# Patient Record
Sex: FEMALE | Race: WHITE | NOT HISPANIC OR LATINO | Employment: FULL TIME | URBAN - METROPOLITAN AREA
[De-identification: names, ages, dates, MRNs, and addresses within clinical notes are randomized per-mention and may not be internally consistent; named-entity substitution may affect disease eponyms.]

---

## 2021-07-11 ENCOUNTER — HOSPITAL ENCOUNTER (EMERGENCY)
Age: 26
Discharge: PSYCHIATRIC OR SUBSTANCE USE FACILITY | End: 2021-07-12
Attending: EMERGENCY MEDICINE

## 2021-07-11 DIAGNOSIS — F32.A DEPRESSION, UNSPECIFIED DEPRESSION TYPE: Primary | ICD-10-CM

## 2021-07-11 DIAGNOSIS — R45.851 SUICIDAL IDEATION: ICD-10-CM

## 2021-07-11 LAB
AMPHETAMINES UR QL SCN>500 NG/ML: NEGATIVE
ANION GAP SERPL CALC-SCNC: 7 MMOL/L (ref 10–20)
BARBITURATES UR QL SCN>200 NG/ML: NEGATIVE
BASOPHILS # BLD: 0 K/MCL (ref 0–0.3)
BASOPHILS NFR BLD: 0 %
BENZODIAZ UR QL SCN>200 NG/ML: NEGATIVE
BUN SERPL-MCNC: 18 MG/DL (ref 6–20)
BUN/CREAT SERPL: 23 (ref 7–25)
BZE UR QL SCN>150 NG/ML: NEGATIVE
CALCIUM SERPL-MCNC: 9.8 MG/DL (ref 8.4–10.2)
CANNABINOIDS UR QL SCN>50 NG/ML: NEGATIVE
CHLORIDE SERPL-SCNC: 107 MMOL/L (ref 98–107)
CO2 SERPL-SCNC: 27 MMOL/L (ref 21–32)
CREAT SERPL-MCNC: 0.8 MG/DL (ref 0.51–0.95)
DEPRECATED RDW RBC: 45.7 FL (ref 39–50)
EOSINOPHIL # BLD: 0.1 K/MCL (ref 0–0.5)
EOSINOPHIL NFR BLD: 1 %
ERYTHROCYTE [DISTWIDTH] IN BLOOD: 13.9 % (ref 11–15)
ETHANOL SERPL-MCNC: NORMAL MG/DL
FASTING DURATION TIME PATIENT: ABNORMAL H
GFR SERPLBLD BASED ON 1.73 SQ M-ARVRAT: >90 ML/MIN/1.73M2
GLUCOSE SERPL-MCNC: 105 MG/DL (ref 65–99)
HCG UR QL: NEGATIVE
HCT VFR BLD CALC: 39.6 % (ref 36–46.5)
HGB BLD-MCNC: 12.7 G/DL (ref 12–15.5)
IMM GRANULOCYTES # BLD AUTO: 0 K/MCL (ref 0–0.2)
IMM GRANULOCYTES # BLD: 0 %
LYMPHOCYTES # BLD: 2.5 K/MCL (ref 1–4.8)
LYMPHOCYTES NFR BLD: 28 %
MCH RBC QN AUTO: 29.1 PG (ref 26–34)
MCHC RBC AUTO-ENTMCNC: 32.1 G/DL (ref 32–36.5)
MCV RBC AUTO: 90.6 FL (ref 78–100)
MONOCYTES # BLD: 0.7 K/MCL (ref 0.3–0.9)
MONOCYTES NFR BLD: 7 %
NEUTROPHILS # BLD: 5.8 K/MCL (ref 1.8–7.7)
NEUTROPHILS NFR BLD: 64 %
NRBC BLD MANUAL-RTO: 0 /100 WBC
OPIATES UR QL SCN>300 NG/ML: NEGATIVE
PCP UR QL SCN>25 NG/ML: NEGATIVE
PLATELET # BLD AUTO: 215 K/MCL (ref 140–450)
POTASSIUM SERPL-SCNC: 3.9 MMOL/L (ref 3.4–5.1)
RBC # BLD: 4.37 MIL/MCL (ref 4–5.2)
SODIUM SERPL-SCNC: 137 MMOL/L (ref 135–145)
WBC # BLD: 9 K/MCL (ref 4.2–11)

## 2021-07-11 PROCEDURE — 85025 COMPLETE CBC W/AUTO DIFF WBC: CPT | Performed by: EMERGENCY MEDICINE

## 2021-07-11 PROCEDURE — 82077 ASSAY SPEC XCP UR&BREATH IA: CPT | Performed by: EMERGENCY MEDICINE

## 2021-07-11 PROCEDURE — C9803 HOPD COVID-19 SPEC COLLECT: HCPCS

## 2021-07-11 PROCEDURE — 80048 BASIC METABOLIC PNL TOTAL CA: CPT | Performed by: EMERGENCY MEDICINE

## 2021-07-11 PROCEDURE — 36415 COLL VENOUS BLD VENIPUNCTURE: CPT

## 2021-07-11 PROCEDURE — 99284 EMERGENCY DEPT VISIT MOD MDM: CPT

## 2021-07-11 PROCEDURE — 84703 CHORIONIC GONADOTROPIN ASSAY: CPT

## 2021-07-11 PROCEDURE — 99284 EMERGENCY DEPT VISIT MOD MDM: CPT | Performed by: EMERGENCY MEDICINE

## 2021-07-11 PROCEDURE — 80307 DRUG TEST PRSMV CHEM ANLYZR: CPT | Performed by: EMERGENCY MEDICINE

## 2021-07-11 ASSESSMENT — PAIN SCALES - GENERAL: PAINLEVEL_OUTOF10: 0

## 2021-07-12 VITALS
TEMPERATURE: 98.4 F | DIASTOLIC BLOOD PRESSURE: 75 MMHG | HEART RATE: 54 BPM | HEIGHT: 62 IN | SYSTOLIC BLOOD PRESSURE: 110 MMHG | WEIGHT: 150 LBS | OXYGEN SATURATION: 99 % | BODY MASS INDEX: 27.6 KG/M2 | RESPIRATION RATE: 16 BRPM

## 2021-07-12 LAB
SARS-COV-2 RNA RESP QL NAA+PROBE: NOT DETECTED
SERVICE CMNT-IMP: NORMAL
SERVICE CMNT-IMP: NORMAL

## 2021-07-12 PROCEDURE — 87635 SARS-COV-2 COVID-19 AMP PRB: CPT | Performed by: EMERGENCY MEDICINE

## 2021-07-12 ASSESSMENT — COLUMBIA-SUICIDE SEVERITY RATING SCALE - C-SSRS
ATTEMPT PAST THREE MONTHS: NO
TOTAL  NUMBER OF INTERRUPTED ATTEMPTS PAST 3 MONTHS: NO
6. HAVE YOU EVER DONE ANYTHING, STARTED TO DO ANYTHING, OR PREPARED TO DO ANYTHING TO END YOUR LIFE?: YES
6. HAVE YOU EVER DONE ANYTHING, STARTED TO DO ANYTHING, OR PREPARED TO DO ANYTHING TO END YOUR LIFE?: NO
REASONS FOR IDEATION PAST MONTH: MOSTLY TO END OR STOP THE PAIN (YOU COULDN'T GO ON LIVING WITH THE PAIN OR HOW YOU WERE FEELING)
TOTAL  NUMBER OF ABORTED OR SELF INTERRUPTED ATTEMPTS PAST 3 MONTHS: YES
TOTAL  NUMBER OF INTERRUPTED ATTEMPTS LIFETIME: NO
TOTAL  NUMBER OF ABORTED OR SELF INTERRUPTED ATTEMPTS PAST 3 MONTHS: 1
ATTEMPT LIFETIME: NO
REASONS FOR IDEATION LIFETIME: MOSTLY TO END OR STOP THE PAIN (YOU COULDN'T GO ON LIVING WITH THE PAIN OR HOW YOU WERE FEELING)

## 2021-07-12 ASSESSMENT — COGNITIVE AND FUNCTIONAL STATUS - GENERAL
MOOD: DEPRESSED
ATTENTION_CALCULATED: MAINTAINS ATTENTION
BEHAVIOR: APPROPRIATE TO SITUATION
AFFECT: ANXIOUS
ORIENTATION: ORIENTED (PERSON/PLACE/TIME)
MOTOR_BEHAVIOR-RETARDATION_CALCULATED: CALM AND PURPOSEFUL
MOTOR_BEHAVIOR-AGITATION_CALCULATED: CALM AND PURPOSEFUL
SPEECH: CLEAR/UNDERSTANDABLE
PERCEPTUAL_MISINTERPRETATIONS_HALLUCINATIONS: CLEAR REALITY BASED PERCEPTIONS
LEVEL_OF_CONSCIOUSNESS_CALCULATED: ALERT
MEMORY: INTACT

## 2021-07-12 ASSESSMENT — LIFESTYLE VARIABLES
AGE OF ALCOHOL ONSET: 21
HOW OFTEN DO YOU HAVE A DRINK CONTAINING ALCOHOL: MONTHLY OR LESS
AUDIT-C TOTAL SCORE: 1
ALCOHOL_USE: YES
HOW MANY STANDARD DRINKS CONTAINING ALCOHOL DO YOU HAVE ON A TYPICAL DAY: 0,1 OR 2
ALCOHOL_TYPE: BEER
ALCOHOL_ROUTE: PO
HOW OFTEN DO YOU HAVE 6 OR MORE DRINKS ON ONE OCCASION: NEVER

## 2023-02-13 ENCOUNTER — TELEPHONE (OUTPATIENT)
Dept: OBGYN CLINIC | Facility: CLINIC | Age: 28
End: 2023-02-13

## 2023-02-13 NOTE — TELEPHONE ENCOUNTER
Patient calling with +HPT. LMP was either 12/27/22 or 1/19/23. She believes 1/19/23 is correct, but states cycle was 3 days instead of her typical 5, and lighter than normal. Patient agrees to policy to rotate care between all providers in the office, and understands that on-call provider will deliver her. Patient directed to start PNV with iron, DHA and folic acid. Pt scheduled for NP, missed menses appt with [facility]. Explained that once dating is established, she will need OBN and NPN. Patient verbalized understanding.

## 2023-02-24 ENCOUNTER — OFFICE VISIT (OUTPATIENT)
Dept: OBGYN CLINIC | Facility: CLINIC | Age: 28
End: 2023-02-24

## 2023-02-24 ENCOUNTER — LAB ENCOUNTER (OUTPATIENT)
Dept: LAB | Facility: HOSPITAL | Age: 28
End: 2023-02-24
Attending: NURSE PRACTITIONER
Payer: COMMERCIAL

## 2023-02-24 VITALS — HEART RATE: 88 BPM | SYSTOLIC BLOOD PRESSURE: 117 MMHG | DIASTOLIC BLOOD PRESSURE: 77 MMHG | WEIGHT: 131 LBS

## 2023-02-24 DIAGNOSIS — N63.11 MASS OF UPPER OUTER QUADRANT OF RIGHT BREAST: ICD-10-CM

## 2023-02-24 DIAGNOSIS — N92.6 MISSED MENSES: ICD-10-CM

## 2023-02-24 DIAGNOSIS — N92.6 IRREGULAR PERIODS: ICD-10-CM

## 2023-02-24 DIAGNOSIS — Z01.419 WELL WOMAN EXAM WITH ROUTINE GYNECOLOGICAL EXAM: Primary | ICD-10-CM

## 2023-02-24 DIAGNOSIS — Z32.01 PREGNANCY EXAMINATION OR TEST, POSITIVE RESULT: ICD-10-CM

## 2023-02-24 DIAGNOSIS — Z12.4 SCREENING FOR CERVICAL CANCER: ICD-10-CM

## 2023-02-24 LAB
ANTIBODY SCREEN: NEGATIVE
B-HCG SERPL-ACNC: ABNORMAL MIU/ML
BASOPHILS # BLD AUTO: 0.02 X10(3) UL (ref 0–0.2)
BASOPHILS NFR BLD AUTO: 0.3 %
DEPRECATED RDW RBC AUTO: 47.7 FL (ref 35.1–46.3)
EOSINOPHIL # BLD AUTO: 0.05 X10(3) UL (ref 0–0.7)
EOSINOPHIL NFR BLD AUTO: 0.7 %
ERYTHROCYTE [DISTWIDTH] IN BLOOD BY AUTOMATED COUNT: 14.3 % (ref 11–15)
HBV SURFACE AG SER-ACNC: <0.1 [IU]/L
HBV SURFACE AG SERPL QL IA: NONREACTIVE
HCT VFR BLD AUTO: 35.8 %
HCV AB SERPL QL IA: NONREACTIVE
HGB BLD-MCNC: 11.9 G/DL
IMM GRANULOCYTES # BLD AUTO: 0.03 X10(3) UL (ref 0–1)
IMM GRANULOCYTES NFR BLD: 0.4 %
LYMPHOCYTES # BLD AUTO: 1.96 X10(3) UL (ref 1–4)
LYMPHOCYTES NFR BLD AUTO: 26.3 %
MCH RBC QN AUTO: 30.1 PG (ref 26–34)
MCHC RBC AUTO-ENTMCNC: 33.2 G/DL (ref 31–37)
MCV RBC AUTO: 90.4 FL
MONOCYTES # BLD AUTO: 0.5 X10(3) UL (ref 0.1–1)
MONOCYTES NFR BLD AUTO: 6.7 %
NEUTROPHILS # BLD AUTO: 4.88 X10 (3) UL (ref 1.5–7.7)
NEUTROPHILS # BLD AUTO: 4.88 X10(3) UL (ref 1.5–7.7)
NEUTROPHILS NFR BLD AUTO: 65.6 %
PLATELET # BLD AUTO: 176 10(3)UL (ref 150–450)
RBC # BLD AUTO: 3.96 X10(6)UL
RH BLOOD TYPE: POSITIVE
RUBV IGG SER QL: POSITIVE
RUBV IGG SER-ACNC: 143.7 IU/ML (ref 10–?)
WBC # BLD AUTO: 7.4 X10(3) UL (ref 4–11)

## 2023-02-24 PROCEDURE — 86803 HEPATITIS C AB TEST: CPT

## 2023-02-24 PROCEDURE — 85025 COMPLETE CBC W/AUTO DIFF WBC: CPT

## 2023-02-24 PROCEDURE — 99385 PREV VISIT NEW AGE 18-39: CPT | Performed by: NURSE PRACTITIONER

## 2023-02-24 PROCEDURE — 87591 N.GONORRHOEAE DNA AMP PROB: CPT

## 2023-02-24 PROCEDURE — 36415 COLL VENOUS BLD VENIPUNCTURE: CPT

## 2023-02-24 PROCEDURE — 86901 BLOOD TYPING SEROLOGIC RH(D): CPT

## 2023-02-24 PROCEDURE — 87340 HEPATITIS B SURFACE AG IA: CPT

## 2023-02-24 PROCEDURE — 87086 URINE CULTURE/COLONY COUNT: CPT

## 2023-02-24 PROCEDURE — 3074F SYST BP LT 130 MM HG: CPT | Performed by: NURSE PRACTITIONER

## 2023-02-24 PROCEDURE — 3078F DIAST BP <80 MM HG: CPT | Performed by: NURSE PRACTITIONER

## 2023-02-24 PROCEDURE — 86900 BLOOD TYPING SEROLOGIC ABO: CPT

## 2023-02-24 PROCEDURE — 86850 RBC ANTIBODY SCREEN: CPT

## 2023-02-24 PROCEDURE — 87491 CHLMYD TRACH DNA AMP PROBE: CPT

## 2023-02-24 PROCEDURE — 84702 CHORIONIC GONADOTROPIN TEST: CPT

## 2023-02-24 PROCEDURE — 86762 RUBELLA ANTIBODY: CPT

## 2023-02-24 PROCEDURE — 86780 TREPONEMA PALLIDUM: CPT

## 2023-02-24 PROCEDURE — 87389 HIV-1 AG W/HIV-1&-2 AB AG IA: CPT

## 2023-02-24 RX ORDER — CHOLECALCIFEROL (VITAMIN D3) 25 MCG
1 TABLET,CHEWABLE ORAL DAILY
COMMUNITY

## 2023-02-25 ENCOUNTER — PATIENT MESSAGE (OUTPATIENT)
Dept: OBGYN CLINIC | Facility: CLINIC | Age: 28
End: 2023-02-25

## 2023-02-26 LAB
C TRACH DNA SPEC QL NAA+PROBE: NEGATIVE
N GONORRHOEA DNA SPEC QL NAA+PROBE: NEGATIVE
T PALLIDUM AB SER QL: NEGATIVE
T VAGINALIS RRNA SPEC QL NAA+PROBE: NEGATIVE

## 2023-02-27 ENCOUNTER — TELEPHONE (OUTPATIENT)
Dept: OBGYN CLINIC | Facility: CLINIC | Age: 28
End: 2023-02-27

## 2023-02-27 DIAGNOSIS — N63.11 MASS OF UPPER OUTER QUADRANT OF RIGHT BREAST: Primary | ICD-10-CM

## 2023-02-27 NOTE — TELEPHONE ENCOUNTER
From: Annmarie Cagle  To: HERMAN Coto  Sent: 2/25/2023 4:54 PM CST  Subject: Pregnancy ultrasound request    Ashley Boyd :)    I called to schedule my pregnancy ultrasound but was unable to due to no order in the system. Would you be able to put in an order for me please?      Thank you so much,  Jennifer Huddleston

## 2023-02-27 NOTE — TELEPHONE ENCOUNTER
Noticed that pt is pregnant. Called radiology back to notify them that pt is pregnant and that is why mammo was not ordered. BODØ from radiology stated that they still need breast US order to say \"yes\" to follow up mammo if needed. BODØ stated that very rarely will they need to be done, but order needs to say yes.

## 2023-02-27 NOTE — TELEPHONE ENCOUNTER
Davina from radiology states pt has an order for an US breast R limited, order is correct but due to age criteria needs to say yes for a f/u mammogram if needed.  Please advise

## 2023-02-27 NOTE — TELEPHONE ENCOUNTER
Alfonso from radiology stated that [facility] placed order for US right breast limited, but the question in the order that asks if OK to perform bilateral mammogram if needed needs to say \"YES\". alfonso stated that [facility] marked question as NO. Alfonso stated that based on pts age, it needs to be checked yes. Message to [facility] if OK to change order?

## 2023-03-13 ENCOUNTER — PATIENT MESSAGE (OUTPATIENT)
Dept: OBGYN CLINIC | Facility: CLINIC | Age: 28
End: 2023-03-13

## 2023-03-17 ENCOUNTER — TELEPHONE (OUTPATIENT)
Dept: OBGYN CLINIC | Facility: CLINIC | Age: 28
End: 2023-03-17

## 2023-03-17 NOTE — TELEPHONE ENCOUNTER
Received fax, OB US dated 3/15/23 from [address] [place] [address]. Report to [facility] desk for review.

## 2023-03-17 NOTE — TELEPHONE ENCOUNTER
Reviewed US from 3/15/2023 - shows Single live IUP CRL 8w1d, + bpm, normal uterus and ovaries. EDC base on US is 10/4/2023. Rns please call patient with results and schedule for  New OB ed and new OB visit.     Macy Briones, APRN

## 2023-03-21 ENCOUNTER — TELEPHONE (OUTPATIENT)
Dept: OBGYN CLINIC | Facility: CLINIC | Age: 28
End: 2023-03-21

## 2023-03-21 DIAGNOSIS — N63.11 MASS OF UPPER OUTER QUADRANT OF RIGHT BREAST: Primary | ICD-10-CM

## 2023-03-21 NOTE — TELEPHONE ENCOUNTER
Right breast US results dated 3/16/2023 received from [facility] and placed on [facility]'s desk for review.

## 2023-03-22 ENCOUNTER — NURSE ONLY (OUTPATIENT)
Dept: OBGYN CLINIC | Facility: CLINIC | Age: 28
End: 2023-03-22

## 2023-03-22 DIAGNOSIS — Z34.01 ENCOUNTER FOR SUPERVISION OF NORMAL FIRST PREGNANCY IN FIRST TRIMESTER: Primary | ICD-10-CM

## 2023-03-22 NOTE — TELEPHONE ENCOUNTER
Pt informed and order placed. Pt advised if she decides to go to [facility] to let us know once appt is scheduled and we can fax the order to them.

## 2023-03-22 NOTE — TELEPHONE ENCOUNTER
rev'd breast US from [facility]. Recommend follow up US of right breast in 6 months - around September of 2023.  See below

## 2023-03-22 NOTE — PROGRESS NOTES
Pt seen for OBN appt today with no complaints dating 9w1d by US. Normal PN labs already completed. I ordered varicella now, since not done. Pt scheduled for NPN appt tomorrow at 0800 with [facility]. Pt desires FTS. HT 5'1\"   lb  BMI 24.7    Partner's name is Lamar Abler contact #[phone]; race:   Occupation: Radiology tech    MEDICAL HISTORY    Anemia No    Anesthetic complications No    Anxiety/Depression  Yes Pt states well-managed    Autoimmune Disorder No    Asthma  No    Cancer No    Diabetes  No    Gyne/breast Surgery Yes BL breast enhancement Sept 2022   Heart Disease No    Hepatitis/Liver Disease  No    History of blood transfusion No    History of abnormal pap No    Hypertension  No    Infertility  No    Kidney Disease/Frequent UTIs  No    Medication Allergies No    Latex Allergies No    Food Allergies  No    Neurological Disorder/Epilepsy No    Operations/Hospitalizations Yes Appendectomy 1999  Liposuction Sept 2022  Tonsillectomy 2000   TB exposure  No    Thyroid Dysfunction No    Trauma/Violence  No    Uterine Anomaly  No    Uterine Fibroids  No    Variocosities/DVTs No    Smoker No    Drug usage in prior year No    Alcohol No    Would you accept a blood transfusion?  Yes              INFECTION HISTORY    Chlamydia No    Pt or partner have hx of Genital Herpes No    Gonorrhea No    Hepatitis B No    HIV No    HPV No    MRSA No    Syphilis No    Tattoos Yes    Live with someone or Exposed to TB No    Rash or viral illness since LMP  No    Varicella No Varicella igg ordered   Recent Travel (or planned travel) to [facility] area for self and or partner Yes Pt traveled to [facility] recently, reviewed CDC zika map, no current outbreaks   Pets Yes 1 dog       GENETICS SCREENING    Genetic Screening    Genetic Screening/Teratology Counseling- Includes patient, baby's father, or anyone in either family with:  Patient's age 28 years or older as of estimated date of delivery: No   Thalassemia ([place], [facility], [address] [facility], or  background): MCV less than 80: No   Neural tube defect (Meningomyelocele, Spina bifida, or Anencephaly): No   Congenital heart defect: Yes (Comment: pt's brother with unknown heart condition)   Down syndrome: No   Brian-Sachs ([place]kenazi [place] orthodox, [place], [place]): No   Canavan disease ([place]kenazi [place] orthodox): No   Familial dysautonomia (Ashkenazi Oriental orthodox): No   Sickle cell disease or trait (): No   Hemophilia or other blood disorders: No   Muscular dystrophy: No    Cystic fibrosis: No   Dimmit's chorea: No   Intellectual disability and/or autism: Yes (Comment: pt's neice with autism)   If yes, was the person tested for Fragile X?: No   Other inherited genetic or chromosomal disorder: No   Maternal metabolic disorder (eg. Type 1 diabetes, PKU): No   Patient or baby's father had child with birth defects not listed above: No   Recurrent pregnancy loss, or a stillbirth: Yes (Comment: pt' sister with multiple miscarriages, pt's cousin with a stillbirth)   Medications (including supplements, vitamins, herbs, or OTC drugs)/illicit/recreational drugs/alcohol since last menstrual period: No             MISC    Infant vaccinations  Yes    Pt. Has answered NO 5P questions and has NO  risk factors. Pt. Given What pregnant women need to know handout.

## 2023-03-23 ENCOUNTER — TELEPHONE (OUTPATIENT)
Dept: OBGYN CLINIC | Facility: CLINIC | Age: 28
End: 2023-03-23

## 2023-03-23 ENCOUNTER — INITIAL PRENATAL (OUTPATIENT)
Dept: OBGYN CLINIC | Facility: CLINIC | Age: 28
End: 2023-03-23

## 2023-03-23 VITALS
HEART RATE: 84 BPM | SYSTOLIC BLOOD PRESSURE: 111 MMHG | HEIGHT: 60.5 IN | BODY MASS INDEX: 25.75 KG/M2 | DIASTOLIC BLOOD PRESSURE: 79 MMHG | WEIGHT: 134.63 LBS

## 2023-03-23 DIAGNOSIS — Z34.80 ENCOUNTER FOR SUPERVISION OF OTHER NORMAL PREGNANCY, UNSPECIFIED TRIMESTER: Primary | ICD-10-CM

## 2023-03-23 DIAGNOSIS — Z3A.09 9 WEEKS GESTATION OF PREGNANCY: ICD-10-CM

## 2023-03-23 DIAGNOSIS — Z36.9 FIRST TRIMESTER SCREENING: Primary | ICD-10-CM

## 2023-03-23 PROBLEM — Z13.79 GENETIC TESTING: Status: ACTIVE | Noted: 2023-03-23

## 2023-03-23 PROBLEM — N63.11 MASS OF UPPER OUTER QUADRANT OF RIGHT BREAST: Status: ACTIVE | Noted: 2023-03-23

## 2023-03-23 PROBLEM — F41.9 ANXIETY: Status: ACTIVE | Noted: 2023-03-23

## 2023-03-23 LAB
APPEARANCE: CLEAR
GLUCOSE (URINE DIPSTICK): NEGATIVE MG/DL
MULTISTIX LOT#: ABNORMAL NUMERIC
NITRITE, URINE: NEGATIVE
PH, URINE: 5 (ref 4.5–8)
SPECIFIC GRAVITY: 1.02 (ref 1–1.03)
URINE-COLOR: YELLOW
UROBILINOGEN,SEMI-QN: 0.2 MG/DL (ref 0–1.9)

## 2023-03-23 PROCEDURE — 76815 OB US LIMITED FETUS(S): CPT | Performed by: NURSE PRACTITIONER

## 2023-03-23 PROCEDURE — 3008F BODY MASS INDEX DOCD: CPT | Performed by: NURSE PRACTITIONER

## 2023-03-23 PROCEDURE — 3074F SYST BP LT 130 MM HG: CPT | Performed by: NURSE PRACTITIONER

## 2023-03-23 PROCEDURE — 81002 URINALYSIS NONAUTO W/O SCOPE: CPT | Performed by: NURSE PRACTITIONER

## 2023-03-23 PROCEDURE — 3078F DIAST BP <80 MM HG: CPT | Performed by: NURSE PRACTITIONER

## 2023-03-23 NOTE — PROGRESS NOTES
New OB. Feeling well. Denies pelvic pain or bleeding. No nausea or vomiting. Sure LMP, regular cycles. Dating US done at [facility]. US on 3/15/2023 shows Isreal Ivette c/w EDC of 10/24/2023. Final EDC based on LMP. Today CRL 9w0d, +FCA. rev'd all OB labs, desires genetic testing - will send referral. UTD with flu and covid vaccines. She is a radiology tech - rev'd precautions. Hx of anxiety - doing well, CTM. Breast lump palpated at last visit. She had US done at [facility] and needs 6 month follow up in 9/2023.  Pap 2/2023 NILM, cultures negative  rto 4 weeks

## 2023-03-23 NOTE — TELEPHONE ENCOUNTER
----- Message from HERMAN Shore sent at 3/23/2023  8:55 AM CDT -----  Regarding: mfm consult  Patient desires [facility] genetic and carrier testing    HERMAN Shore

## 2024-07-17 ENCOUNTER — APPOINTMENT (OUTPATIENT)
Dept: FAMILY MEDICINE | Age: 29
End: 2024-07-17

## 2024-08-14 ENCOUNTER — BEHAVIORAL HEALTH (OUTPATIENT)
Dept: BEHAVIORAL HEALTH | Age: 29
End: 2024-08-14

## 2024-08-14 DIAGNOSIS — F41.1 GENERALIZED ANXIETY DISORDER WITH PANIC ATTACKS: ICD-10-CM

## 2024-08-14 DIAGNOSIS — F41.0 GENERALIZED ANXIETY DISORDER WITH PANIC ATTACKS: ICD-10-CM

## 2024-08-14 DIAGNOSIS — F39 MOOD DISORDER (CMD): Primary | ICD-10-CM

## 2024-08-14 PROCEDURE — 90792 PSYCH DIAG EVAL W/MED SRVCS: CPT

## 2024-08-14 RX ORDER — LAMOTRIGINE 25 MG/1
25-50 TABLET ORAL DAILY
Qty: 60 TABLET | Refills: 0 | Status: SHIPPED | OUTPATIENT
Start: 2024-08-14

## 2024-08-14 ASSESSMENT — ENCOUNTER SYMPTOMS
WHEEZING: 0
NUMBNESS: 0
COUGH: 0
SHORTNESS OF BREATH: 0
TREMORS: 0
SLEEP DISTURBANCE: 1
SEIZURES: 0
DIZZINESS: 0
LIGHT-HEADEDNESS: 0
NERVOUS/ANXIOUS: 1
HEADACHES: 0
DIARRHEA: 0
CHEST TIGHTNESS: 0
VOMITING: 0
AGITATION: 0
WEAKNESS: 0
CONSTIPATION: 0
CONFUSION: 0
NAUSEA: 0
CHOKING: 0

## 2024-08-14 ASSESSMENT — PATIENT HEALTH QUESTIONNAIRE - PHQ9
1. LITTLE INTEREST OR PLEASURE IN DOING THINGS: SEVERAL DAYS
SUM OF ALL RESPONSES TO PHQ9 QUESTIONS 1 AND 2: 2
CLINICAL INTERPRETATION OF PHQ2 SCORE: 2
2. FEELING DOWN, DEPRESSED OR HOPELESS: SEVERAL DAYS
CLINICAL INTERPRETATION OF PHQ2 SCORE: NO FURTHER SCREENING NEEDED

## 2024-08-14 ASSESSMENT — ANXIETY QUESTIONNAIRES
4. TROUBLE RELAXING: MORE THAN HALF THE DAYS
GAD7 TOTAL SCORE: 15
5. BEING SO RESTLESS THAT IT IS HARD TO SIT STILL: SEVERAL DAYS
3. WORRYING TOO MUCH ABOUT DIFFERENT THINGS: NEARLY EVERY DAY
7. FEELING AFRAID AS IF SOMETHING AWFUL MIGHT HAPPEN: MORE THAN HALF THE DAYS
2. NOT BEING ABLE TO STOP OR CONTROL WORRYING: MORE THAN HALF THE DAYS
4. TROUBLE RELAXING: MORE THAN HALF THE DAYS
IF YOU CHECKED OFF ANY PROBLEMS ON THIS QUESTIONNAIRE, HOW DIFFICULT HAVE THESE PROBLEMS MADE IT FOR YOU TO DO YOUR WORK, TAKE CARE OF THINGS AT HOME, OR GET ALONG WITH OTHER PEOPLE: SOMEWHAT DIFFICULT
2. NOT BEING ABLE TO STOP OR CONTROL WORRYING: MORE THAN HALF THE DAYS
7. FEELING AFRAID AS IF SOMETHING AWFUL MIGHT HAPPEN: MORE THAN HALF THE DAYS
5. BEING SO RESTLESS THAT IT IS HARD TO SIT STILL: SEVERAL DAYS
3. WORRYING TOO MUCH ABOUT DIFFERENT THINGS: NEARLY EVERY DAY
6. BECOMING EASILY ANNOYED OR IRRITABLE: NEARLY EVERY DAY
1. FEELING NERVOUS, ANXIOUS, OR ON EDGE: MORE THAN HALF THE DAYS
1. FEELING NERVOUS, ANXIOUS, OR ON EDGE: MORE THAN HALF THE DAYS
6. BECOMING EASILY ANNOYED OR IRRITABLE: NEARLY EVERY DAY
IF YOU CHECKED OFF ANY PROBLEMS ON THIS QUESTIONNAIRE, HOW DIFFICULT HAVE THESE PROBLEMS MADE IT FOR YOU TO DO YOUR WORK, TAKE CARE OF THINGS AT HOME, OR GET ALONG WITH OTHER PEOPLE: SOMEWHAT DIFFICULT

## 2024-08-21 ENCOUNTER — APPOINTMENT (OUTPATIENT)
Dept: BEHAVIORAL HEALTH | Age: 29
End: 2024-08-21

## 2024-08-21 DIAGNOSIS — F39 MOOD DISORDER (CMD): ICD-10-CM

## 2024-08-21 DIAGNOSIS — F41.1 GENERALIZED ANXIETY DISORDER WITH PANIC ATTACKS: Primary | ICD-10-CM

## 2024-08-21 DIAGNOSIS — F41.0 GENERALIZED ANXIETY DISORDER WITH PANIC ATTACKS: Primary | ICD-10-CM

## 2024-08-21 PROBLEM — F43.9 TRAUMA AND STRESSOR-RELATED DISORDER: Status: ACTIVE | Noted: 2024-08-21

## 2024-08-21 PROCEDURE — 99214 OFFICE O/P EST MOD 30 MIN: CPT

## 2024-08-21 RX ORDER — LAMOTRIGINE 25 MG/1
50 TABLET ORAL DAILY
Qty: 60 TABLET | Refills: 0 | Status: SHIPPED | OUTPATIENT
Start: 2024-08-21

## 2024-09-04 ENCOUNTER — HOSPITAL ENCOUNTER (OUTPATIENT)
Dept: ULTRASOUND IMAGING | Age: 29
Discharge: HOME | End: 2024-09-04
Attending: OBSTETRICS & GYNECOLOGY

## 2024-09-04 DIAGNOSIS — N63.10 MASS OF MULTIPLE SITES OF RIGHT BREAST: ICD-10-CM

## 2024-09-04 PROCEDURE — 76642 ULTRASOUND BREAST LIMITED: CPT

## 2024-09-19 ENCOUNTER — APPOINTMENT (OUTPATIENT)
Dept: BEHAVIORAL HEALTH | Age: 29
End: 2024-09-19

## 2024-10-08 ENCOUNTER — APPOINTMENT (OUTPATIENT)
Dept: BEHAVIORAL HEALTH | Age: 29
End: 2024-10-08

## 2024-10-08 DIAGNOSIS — F41.1 GENERALIZED ANXIETY DISORDER WITH PANIC ATTACKS: ICD-10-CM

## 2024-10-08 DIAGNOSIS — F60.3 BORDERLINE PERSONALITY DISORDER  (CMD): ICD-10-CM

## 2024-10-08 DIAGNOSIS — F39 MOOD DISORDER (CMD): Primary | ICD-10-CM

## 2024-10-08 DIAGNOSIS — F41.0 GENERALIZED ANXIETY DISORDER WITH PANIC ATTACKS: ICD-10-CM

## 2024-10-08 DIAGNOSIS — F43.9 TRAUMA AND STRESSOR-RELATED DISORDER: ICD-10-CM

## 2024-10-08 PROCEDURE — 99214 OFFICE O/P EST MOD 30 MIN: CPT

## 2024-10-08 RX ORDER — LAMOTRIGINE 100 MG/1
TABLET ORAL
Qty: 30 TABLET | Refills: 0 | Status: SHIPPED | OUTPATIENT
Start: 2024-10-08

## 2024-10-29 ENCOUNTER — APPOINTMENT (OUTPATIENT)
Dept: BEHAVIORAL HEALTH | Age: 29
End: 2024-10-29

## 2024-10-29 DIAGNOSIS — F41.1 GENERALIZED ANXIETY DISORDER: Primary | ICD-10-CM

## 2024-10-29 PROCEDURE — 90837 PSYTX W PT 60 MINUTES: CPT | Performed by: SOCIAL WORKER

## 2024-11-05 ENCOUNTER — APPOINTMENT (OUTPATIENT)
Dept: BEHAVIORAL HEALTH | Age: 29
End: 2024-11-05

## 2024-11-05 DIAGNOSIS — F39 MOOD DISORDER (CMD): Primary | ICD-10-CM

## 2024-11-05 DIAGNOSIS — F41.1 GENERALIZED ANXIETY DISORDER WITH PANIC ATTACKS: ICD-10-CM

## 2024-11-05 DIAGNOSIS — F41.0 GENERALIZED ANXIETY DISORDER WITH PANIC ATTACKS: ICD-10-CM

## 2024-11-05 DIAGNOSIS — F41.1 GENERALIZED ANXIETY DISORDER: Primary | ICD-10-CM

## 2024-11-05 DIAGNOSIS — F43.9 TRAUMA AND STRESSOR-RELATED DISORDER: ICD-10-CM

## 2024-11-05 DIAGNOSIS — F60.3 BORDERLINE PERSONALITY DISORDER  (CMD): ICD-10-CM

## 2024-11-05 PROCEDURE — 99214 OFFICE O/P EST MOD 30 MIN: CPT

## 2024-11-05 RX ORDER — LAMOTRIGINE 100 MG/1
150 TABLET ORAL DAILY
Qty: 45 TABLET | Refills: 0 | Status: SHIPPED | OUTPATIENT
Start: 2024-11-05

## 2024-11-12 ENCOUNTER — APPOINTMENT (OUTPATIENT)
Dept: BEHAVIORAL HEALTH | Age: 29
End: 2024-11-12

## 2024-12-03 ENCOUNTER — APPOINTMENT (OUTPATIENT)
Dept: BEHAVIORAL HEALTH | Age: 29
End: 2024-12-03

## 2024-12-03 DIAGNOSIS — F39 MOOD DISORDER (CMD): Primary | ICD-10-CM

## 2024-12-03 DIAGNOSIS — F60.3 BORDERLINE PERSONALITY DISORDER  (CMD): ICD-10-CM

## 2024-12-03 DIAGNOSIS — F41.1 GENERALIZED ANXIETY DISORDER WITH PANIC ATTACKS: ICD-10-CM

## 2024-12-03 DIAGNOSIS — F41.0 GENERALIZED ANXIETY DISORDER WITH PANIC ATTACKS: ICD-10-CM

## 2024-12-03 DIAGNOSIS — F43.9 TRAUMA AND STRESSOR-RELATED DISORDER: ICD-10-CM

## 2024-12-03 PROCEDURE — 99214 OFFICE O/P EST MOD 30 MIN: CPT

## 2024-12-03 RX ORDER — LAMOTRIGINE 100 MG/1
150 TABLET ORAL DAILY
Qty: 45 TABLET | Refills: 0 | Status: SHIPPED | OUTPATIENT
Start: 2024-12-03

## 2024-12-31 ENCOUNTER — APPOINTMENT (OUTPATIENT)
Age: 29
End: 2024-12-31

## 2025-03-20 ENCOUNTER — BEHAVIORAL HEALTH (OUTPATIENT)
Age: 30
End: 2025-03-20

## 2025-03-20 DIAGNOSIS — F33.1 MAJOR DEPRESSIVE DISORDER, RECURRENT EPISODE, MODERATE (CMD): Primary | ICD-10-CM

## 2025-03-20 DIAGNOSIS — F41.1 GENERALIZED ANXIETY DISORDER WITH PANIC ATTACKS: ICD-10-CM

## 2025-03-20 DIAGNOSIS — F60.3 BORDERLINE PERSONALITY DISORDER  (CMD): ICD-10-CM

## 2025-03-20 DIAGNOSIS — F41.0 GENERALIZED ANXIETY DISORDER WITH PANIC ATTACKS: ICD-10-CM

## 2025-03-20 PROCEDURE — 99214 OFFICE O/P EST MOD 30 MIN: CPT

## 2025-03-20 RX ORDER — LAMOTRIGINE 25 MG/1
25-50 TABLET ORAL DAILY
Qty: 60 TABLET | Refills: 0 | Status: SHIPPED | OUTPATIENT
Start: 2025-03-20

## 2025-03-20 RX ORDER — SERTRALINE HYDROCHLORIDE 25 MG/1
25 TABLET, FILM COATED ORAL DAILY
Qty: 30 TABLET | Refills: 0 | Status: SHIPPED | OUTPATIENT
Start: 2025-03-20

## 2025-04-10 ENCOUNTER — APPOINTMENT (OUTPATIENT)
Age: 30
End: 2025-04-10

## 2025-04-10 DIAGNOSIS — F60.3 BORDERLINE PERSONALITY DISORDER  (CMD): ICD-10-CM

## 2025-04-10 DIAGNOSIS — F41.0 GENERALIZED ANXIETY DISORDER WITH PANIC ATTACKS: ICD-10-CM

## 2025-04-10 DIAGNOSIS — F41.1 GENERALIZED ANXIETY DISORDER WITH PANIC ATTACKS: ICD-10-CM

## 2025-04-10 DIAGNOSIS — F33.1 MAJOR DEPRESSIVE DISORDER, RECURRENT EPISODE, MODERATE (CMD): Primary | ICD-10-CM

## 2025-04-10 PROCEDURE — 99214 OFFICE O/P EST MOD 30 MIN: CPT

## 2025-04-10 RX ORDER — SERTRALINE HYDROCHLORIDE 100 MG/1
50-100 TABLET, FILM COATED ORAL DAILY
Qty: 30 TABLET | Refills: 0 | Status: SHIPPED | OUTPATIENT
Start: 2025-04-10

## 2025-04-10 RX ORDER — LAMOTRIGINE 100 MG/1
100 TABLET ORAL DAILY
Qty: 30 TABLET | Refills: 0 | Status: SHIPPED | OUTPATIENT
Start: 2025-04-10

## 2025-05-02 PROBLEM — Z00.00 WELL ADULT EXAM: Status: ACTIVE | Noted: 2025-05-02

## 2025-05-02 PROBLEM — N60.01 CYST OF RIGHT BREAST: Status: ACTIVE | Noted: 2025-05-02

## 2025-05-02 PROBLEM — Z12.4 SCREENING FOR MALIGNANT NEOPLASM OF THE CERVIX: Status: ACTIVE | Noted: 2025-05-02

## 2025-05-02 PROBLEM — J45.909 REACTIVE AIRWAY DISEASE WITHOUT COMPLICATION (CMD): Status: ACTIVE | Noted: 2025-05-02

## 2025-05-08 ENCOUNTER — APPOINTMENT (OUTPATIENT)
Age: 30
End: 2025-05-08

## 2025-05-08 DIAGNOSIS — F41.0 GENERALIZED ANXIETY DISORDER WITH PANIC ATTACKS: ICD-10-CM

## 2025-05-08 DIAGNOSIS — F41.1 GENERALIZED ANXIETY DISORDER WITH PANIC ATTACKS: ICD-10-CM

## 2025-05-08 DIAGNOSIS — F33.1 MAJOR DEPRESSIVE DISORDER, RECURRENT EPISODE, MODERATE (CMD): ICD-10-CM

## 2025-05-08 DIAGNOSIS — F39 MOOD DISORDER (CMD): Primary | ICD-10-CM

## 2025-05-08 DIAGNOSIS — F60.3 BORDERLINE PERSONALITY DISORDER  (CMD): ICD-10-CM

## 2025-05-08 PROCEDURE — 99214 OFFICE O/P EST MOD 30 MIN: CPT

## 2025-05-08 RX ORDER — SERTRALINE HYDROCHLORIDE 100 MG/1
100 TABLET, FILM COATED ORAL DAILY
Qty: 30 TABLET | Refills: 0 | Status: SHIPPED | OUTPATIENT
Start: 2025-05-08

## 2025-05-08 RX ORDER — LAMOTRIGINE 100 MG/1
150 TABLET ORAL DAILY
Qty: 45 TABLET | Refills: 0 | Status: SHIPPED | OUTPATIENT
Start: 2025-05-08

## 2025-05-15 ENCOUNTER — HOSPITAL ENCOUNTER (OUTPATIENT)
Dept: ULTRASOUND IMAGING | Age: 30
Discharge: HOME | End: 2025-05-15
Attending: FAMILY MEDICINE

## 2025-05-15 ENCOUNTER — HOSPITAL ENCOUNTER (OUTPATIENT)
Dept: MAMMOGRAPHY | Age: 30
Discharge: HOME | End: 2025-05-15
Attending: FAMILY MEDICINE

## 2025-05-15 DIAGNOSIS — N60.01 CYST OF RIGHT BREAST: ICD-10-CM

## 2025-05-15 PROCEDURE — 76642 ULTRASOUND BREAST LIMITED: CPT

## 2025-05-22 ENCOUNTER — APPOINTMENT (OUTPATIENT)
Age: 30
End: 2025-05-22

## 2025-05-22 DIAGNOSIS — F60.3 BORDERLINE PERSONALITY DISORDER  (CMD): ICD-10-CM

## 2025-05-22 DIAGNOSIS — F33.1 MAJOR DEPRESSIVE DISORDER, RECURRENT EPISODE, MODERATE (CMD): ICD-10-CM

## 2025-05-22 DIAGNOSIS — F39 MOOD DISORDER (CMD): ICD-10-CM

## 2025-05-22 DIAGNOSIS — F41.0 GENERALIZED ANXIETY DISORDER WITH PANIC ATTACKS: Primary | ICD-10-CM

## 2025-05-22 DIAGNOSIS — F41.1 GENERALIZED ANXIETY DISORDER WITH PANIC ATTACKS: Primary | ICD-10-CM

## 2025-05-22 PROCEDURE — 99214 OFFICE O/P EST MOD 30 MIN: CPT

## 2025-05-22 RX ORDER — LAMOTRIGINE 150 MG/1
150 TABLET ORAL DAILY
Qty: 90 TABLET | Refills: 0 | Status: SHIPPED | OUTPATIENT
Start: 2025-05-22 | End: 2025-05-22 | Stop reason: SDUPTHER

## 2025-05-22 RX ORDER — LAMOTRIGINE 150 MG/1
150 TABLET ORAL DAILY
Qty: 30 TABLET | Refills: 0 | Status: SHIPPED | OUTPATIENT
Start: 2025-05-22 | End: 2025-05-22 | Stop reason: SDUPTHER

## 2025-05-22 RX ORDER — LAMOTRIGINE 150 MG/1
150 TABLET ORAL DAILY
Qty: 90 TABLET | Refills: 0 | Status: SHIPPED | OUTPATIENT
Start: 2025-05-22

## 2025-05-30 ENCOUNTER — APPOINTMENT (OUTPATIENT)
Dept: MAMMOGRAPHY | Age: 30
End: 2025-05-30
Attending: FAMILY MEDICINE

## 2025-06-06 PROBLEM — Z30.431 IUD CHECK UP: Status: ACTIVE | Noted: 2025-06-06

## 2025-06-16 DIAGNOSIS — F60.3 BORDERLINE PERSONALITY DISORDER  (CMD): ICD-10-CM

## 2025-06-16 DIAGNOSIS — F33.1 MAJOR DEPRESSIVE DISORDER, RECURRENT EPISODE, MODERATE (CMD): ICD-10-CM

## 2025-06-17 RX ORDER — LAMOTRIGINE 150 MG/1
150 TABLET ORAL DAILY
Qty: 90 TABLET | Refills: 0 | Status: SHIPPED | OUTPATIENT
Start: 2025-06-17

## 2025-06-23 PROBLEM — Z34.90 PREGNANCY (CMD): Status: ACTIVE | Noted: 2025-06-23

## 2025-06-23 PROBLEM — N30.01 ACUTE CYSTITIS WITH HEMATURIA: Status: ACTIVE | Noted: 2025-06-23

## 2025-06-26 ENCOUNTER — APPOINTMENT (OUTPATIENT)
Age: 30
End: 2025-06-26

## 2025-06-26 ENCOUNTER — APPOINTMENT (OUTPATIENT)
Dept: OBGYN | Age: 30
End: 2025-06-26

## 2025-07-24 ENCOUNTER — APPOINTMENT (OUTPATIENT)
Dept: OBGYN | Age: 30
End: 2025-07-24